# Patient Record
Sex: FEMALE | Race: BLACK OR AFRICAN AMERICAN | NOT HISPANIC OR LATINO | Employment: FULL TIME | URBAN - METROPOLITAN AREA
[De-identification: names, ages, dates, MRNs, and addresses within clinical notes are randomized per-mention and may not be internally consistent; named-entity substitution may affect disease eponyms.]

---

## 2019-09-27 ENCOUNTER — OFFICE VISIT (OUTPATIENT)
Dept: URGENT CARE | Facility: CLINIC | Age: 44
End: 2019-09-27
Payer: COMMERCIAL

## 2019-09-27 VITALS
RESPIRATION RATE: 18 BRPM | HEART RATE: 60 BPM | SYSTOLIC BLOOD PRESSURE: 150 MMHG | OXYGEN SATURATION: 100 % | DIASTOLIC BLOOD PRESSURE: 73 MMHG | BODY MASS INDEX: 31.31 KG/M2 | TEMPERATURE: 98.2 F | HEIGHT: 66 IN | WEIGHT: 194.8 LBS

## 2019-09-27 DIAGNOSIS — H66.91 RIGHT OTITIS MEDIA, UNSPECIFIED OTITIS MEDIA TYPE: Primary | ICD-10-CM

## 2019-09-27 PROCEDURE — G0382 LEV 3 HOSP TYPE B ED VISIT: HCPCS | Performed by: PHYSICIAN ASSISTANT

## 2019-09-27 RX ORDER — BENZONATATE 100 MG/1
100 CAPSULE ORAL 3 TIMES DAILY PRN
Qty: 15 CAPSULE | Refills: 0 | Status: SHIPPED | OUTPATIENT
Start: 2019-09-27

## 2019-09-27 RX ORDER — PREDNISONE 50 MG/1
50 TABLET ORAL DAILY
Qty: 5 TABLET | Refills: 0 | Status: SHIPPED | OUTPATIENT
Start: 2019-09-27 | End: 2019-10-02

## 2019-09-27 RX ORDER — AMOXICILLIN 500 MG/1
500 CAPSULE ORAL EVERY 12 HOURS SCHEDULED
Qty: 20 CAPSULE | Refills: 0 | Status: SHIPPED | OUTPATIENT
Start: 2019-09-27 | End: 2019-10-07

## 2019-09-27 NOTE — PATIENT INSTRUCTIONS
take antibiotic as directed until completed   take prednisone as directed until completed   use cough medicine as directed for symptomatic relief   Motrin and/or Tylenol as needed for fevers aches and pains   drink plenty of fluids  Follow up with PCP in 3-5 days  Proceed to  ER if symptoms worsen  Otitis Media   AMBULATORY CARE:   Otitis media  is an ear infection  Common symptoms include the following:   · Fever or a headache    · Ear pain    · Trouble hearing    · Ear feels plugged or full or you have ringing or buzzing in your ear    · Dizziness or you lose your balance    · Nausea or vomiting  Seek immediate care for the following symptoms:   · Seizure    · Fever and a stiff neck  Treatment for otitis media  may include any of the following:  · NSAIDs , such as ibuprofen, help decrease swelling, pain, and fever  This medicine is available with or without a doctor's order  NSAIDs can cause stomach bleeding or kidney problems in certain people  If you take blood thinner medicine, always ask your healthcare provider if NSAIDs are safe for you  Always read the medicine label and follow directions  · Ear drops  to help treat your ear pain  · Antibiotics  to help kill the germs that caused your ear infection  Care for otitis media:   · Use heat  Place a warm, moist washcloth on your ear to decrease pain  Apply for 15 to 20 minutes, 3 to 4 times a day    · Use ice  Ice helps decrease swelling and pain  Use an ice pack or put crushed ice in a plastic bag  Cover the ice pack with a towel and place it on your ear for 15 to 20 minutes, 3 to 4 times a day for 2 days  Prevent otitis media:   · Wash your hands often  This will help prevent the spread of germs  Encourage everyone in your house to wash their hands with soap and water after they use the bathroom  Everyone should also wash their hands after they change a child's diaper and before they prepare or eat food  · Stay away from people who are ill  Germs are easily and quickly spread through contact  Follow up with your healthcare provider as directed:  Write down your questions so you remember to ask them during your visits  © 2017 [employer] Information is for End User's use only and may not be sold, redistributed or otherwise used for commercial purposes  All illustrations and images included in CareNotes® are the copyrighted property of [employer]  or [employer]  The above information is an  only  It is not intended as medical advice for individual conditions or treatments  Talk to your doctor, nurse or pharmacist before following any medical regimen to see if it is safe and effective for you

## 2019-09-27 NOTE — PROGRESS NOTES
[facility]        NAME: Yaritza Kapadia is a 39 y o  female  : 1975    MRN: [id number]  DATE: 2019  TIME: 2:24 PM    Assessment and Plan   Right otitis media, unspecified otitis media type [H66 91]  1  Right otitis media, unspecified otitis media type  amoxicillin (AMOXIL) 500 mg capsule    benzonatate (TESSALON PERLES) 100 mg capsule    predniSONE 50 mg tablet         Patient Instructions      take antibiotic as directed until completed   take prednisone as directed until completed   use cough medicine as directed for symptomatic relief   Motrin and/or Tylenol as needed for fevers aches and pains   drink plenty of fluids  Follow up with PCP in 3-5 days  Proceed to  ER if symptoms worsen  Chief Complaint     Chief Complaint   Patient presents with    Cough     started last friday    Earache    Sore Throat         History of Present Illness        40-year-old female presents with five-day history of right ear discomfort and dry hacking cough  Denies any decreased hearing  No drainage from ear  Denies any fevers  No headaches or rashes reported  No chest pain shortness of breath  No abdominal pain nausea vomiting or diarrhea  Cough   This is a new problem  The problem has been waxing and waning  The problem occurs constantly  The cough is non-productive  Associated symptoms include postnasal drip and a sore throat  Pertinent negatives include no chills, ear congestion, ear pain, fever, headaches, shortness of breath or wheezing  Nothing aggravates the symptoms  She has tried nothing for the symptoms  The treatment provided no relief  There is no history of asthma  Earache    There is pain in the right ear  This is a new problem  The current episode started in the past 7 days  The problem has been waxing and waning  There has been no fever  The pain is moderate  Associated symptoms include coughing and a sore throat   Pertinent negatives include no ear discharge or headaches  She has tried nothing for the symptoms  The treatment provided no relief  Review of Systems   Review of Systems   Constitutional: Negative  Negative for chills and fever  HENT: Positive for postnasal drip and sore throat  Negative for ear discharge and ear pain  Respiratory: Positive for cough  Negative for shortness of breath and wheezing  Cardiovascular: Negative  Gastrointestinal: Negative  Musculoskeletal: Negative  Skin: Negative  Neurological: Negative  Negative for headaches  Current Medications       Current Outpatient Medications:     amoxicillin (AMOXIL) 500 mg capsule, Take 1 capsule (500 mg total) by mouth every 12 (twelve) hours for 10 days, Disp: 20 capsule, Rfl: 0    benzonatate (TESSALON PERLES) 100 mg capsule, Take 1 capsule (100 mg total) by mouth 3 (three) times a day as needed for cough, Disp: 15 capsule, Rfl: 0    predniSONE 50 mg tablet, Take 1 tablet (50 mg total) by mouth daily for 5 days, Disp: 5 tablet, Rfl: 0    Current Allergies     Allergies as of 09/27/2019    (No Known Allergies)            The following portions of the patient's history were reviewed and updated as appropriate: allergies, current medications, past family history, past medical history, past social history, past surgical history and problem list      History reviewed  No pertinent past medical history  History reviewed  No pertinent surgical history  Family History   Problem Relation Age of Onset    Diabetes Mother     Diabetes Father     Hypertension Father          Medications have been verified  Objective   /73   Pulse 60   Temp 98 2 °F (36 8 °C) (Temporal)   Resp 18   Ht 5' 6" (1 676 m)   Wt 88 4 kg (194 lb 12 8 oz)   LMP 09/03/2019 (Within Days)   SpO2 100%   BMI 31 44 kg/m²        Physical Exam     Physical Exam   Constitutional: She is oriented to person, place, and time  She appears well-developed and well-nourished   No distress  HENT:   Head: Normocephalic and atraumatic  Right Ear: Hearing, external ear and ear canal normal  Tympanic membrane is injected and bulging  Left Ear: Hearing, tympanic membrane, external ear and ear canal normal    Nose: Nose normal    Mouth/Throat: Uvula is midline, oropharynx is clear and moist and mucous membranes are normal  No oropharyngeal exudate, posterior oropharyngeal edema or posterior oropharyngeal erythema  Eyes: Conjunctivae and EOM are normal  Right eye exhibits no discharge  Left eye exhibits no discharge  Neck: Normal range of motion  Neck supple  Cardiovascular: Normal rate, regular rhythm, normal heart sounds and intact distal pulses  No murmur heard  Pulmonary/Chest: Effort normal and breath sounds normal  No respiratory distress  She has no wheezes  She has no rales  Abdominal: Soft  Bowel sounds are normal  There is no tenderness  Musculoskeletal: Normal range of motion  Lymphadenopathy:     She has no cervical adenopathy  Neurological: She is alert and oriented to person, place, and time  Skin: Skin is warm and dry  Psychiatric: She has a normal mood and affect  Nursing note and vitals reviewed

## 2023-12-29 ENCOUNTER — OFFICE VISIT (OUTPATIENT)
Age: 48
End: 2023-12-29
Payer: COMMERCIAL

## 2023-12-29 VITALS
DIASTOLIC BLOOD PRESSURE: 87 MMHG | SYSTOLIC BLOOD PRESSURE: 132 MMHG | HEART RATE: 77 BPM | BODY MASS INDEX: 42.69 KG/M2 | HEIGHT: 65 IN | WEIGHT: 256.2 LBS | OXYGEN SATURATION: 98 %

## 2023-12-29 DIAGNOSIS — E66.01 CLASS 3 SEVERE OBESITY DUE TO EXCESS CALORIES WITHOUT SERIOUS COMORBIDITY WITH BODY MASS INDEX (BMI) OF 40.0 TO 44.9 IN ADULT (HCC): ICD-10-CM

## 2023-12-29 DIAGNOSIS — Z12.11 ENCOUNTER FOR SCREENING COLONOSCOPY: Primary | ICD-10-CM

## 2023-12-29 PROCEDURE — 99203 OFFICE O/P NEW LOW 30 MIN: CPT | Performed by: COLON & RECTAL SURGERY

## 2023-12-29 NOTE — PATIENT INSTRUCTIONS
Scheduled date of colonoscopy (as of today): 1/15/24  Physician performing colonoscopy: Anny  Location of colonoscopy: [facility]  Bowel prep reviewed with patient: Golytely  Instructions reviewed with patient by: Denisse FERGUSON  Clearances:

## 2023-12-29 NOTE — PROGRESS NOTES
"Assessment/Plan:  Patient is a 48-year-old woman with average risk profile for colorectal cancer who presents for screening colonoscopy       Diagnoses and all orders for this visit:    Encounter for screening colonoscopy  -     Colonoscopy; Future    Class 3 severe obesity due to excess calories without serious comorbidity with body mass index (BMI) of 40.0 to 44.9 in adult (HCC)    Other orders  -     Diet NPO; Sips with meds; Standing  -     Void on call to OR; Standing  -     Insert peripheral IV; Standing          Subjective:      Patient ID: Alicia Shaw is a 48 y.o. female.    HPI patient is a 48-year-old woman with average colorectal risk profile presents for age indicated screening colonoscopy    The following portions of the patient's history were reviewed and updated as appropriate: allergies, current medications, past family history, past medical history, past social history, past surgical history, and problem list.    Review of Systems   Constitutional:  Negative for chills and fever.   HENT:  Negative for ear pain and sore throat.    Eyes:  Negative for pain and visual disturbance.   Respiratory:  Negative for cough and shortness of breath.    Cardiovascular:  Negative for chest pain and palpitations.   Gastrointestinal:  Negative for abdominal pain and vomiting.   Genitourinary:  Negative for dysuria and hematuria.   Musculoskeletal:  Negative for arthralgias and back pain.   Skin:  Negative for color change and rash.   Neurological:  Negative for seizures and syncope.   All other systems reviewed and are negative.        Objective:      /87   Pulse 77   Ht 5' 5\" (1.651 m)   Wt 116 kg (256 lb 3.2 oz)   SpO2 98%   BMI 42.63 kg/m²          Physical Exam  Vitals and nursing note reviewed.   Constitutional:       Appearance: Normal appearance. She is obese.   HENT:      Head: Normocephalic and atraumatic.   Eyes:      Conjunctiva/sclera: Conjunctivae normal.   Cardiovascular:      " Rate and Rhythm: Normal rate and regular rhythm.      Heart sounds: Normal heart sounds.   Pulmonary:      Effort: Pulmonary effort is normal.      Breath sounds: Normal breath sounds.   Abdominal:      General: Bowel sounds are normal.      Palpations: Abdomen is soft.   Musculoskeletal:      Cervical back: Neck supple.   Neurological:      Mental Status: She is alert and oriented to person, place, and time.   Psychiatric:         Mood and Affect: Mood normal.         Thought Content: Thought content normal.         Judgment: Judgment normal.

## 2024-01-08 ENCOUNTER — TELEPHONE (OUTPATIENT)
Age: 49
End: 2024-01-08

## 2024-01-08 NOTE — TELEPHONE ENCOUNTER
"I spoke to patient This is [facility] confirming your upcoming Colonoscopy/EGD for 1/15/24 with Dr Mccormick. Please keep in mind you will receive a phone call the day prior with your exact arrival time.     Now is a good time to review your prep instructions. If you have any questions regarding the diet or prep instructions, please contact the office @ [phone].     Please reply \"Yes\" to confirm. To reschedule, please call the office at [phone].      "

## 2024-01-15 ENCOUNTER — HOSPITAL ENCOUNTER (OUTPATIENT)
Dept: GASTROENTEROLOGY | Facility: HOSPITAL | Age: 49
Setting detail: OUTPATIENT SURGERY
Discharge: HOME | End: 2024-01-15
Attending: COLON & RECTAL SURGERY
Payer: COMMERCIAL

## 2024-01-15 ENCOUNTER — ANESTHESIA EVENT (OUTPATIENT)
Dept: GASTROENTEROLOGY | Facility: HOSPITAL | Age: 49
End: 2024-01-15

## 2024-01-15 ENCOUNTER — ANESTHESIA (OUTPATIENT)
Dept: GASTROENTEROLOGY | Facility: HOSPITAL | Age: 49
End: 2024-01-15

## 2024-01-15 VITALS
DIASTOLIC BLOOD PRESSURE: 76 MMHG | HEART RATE: 53 BPM | TEMPERATURE: 98 F | BODY MASS INDEX: 39.93 KG/M2 | HEIGHT: 66 IN | SYSTOLIC BLOOD PRESSURE: 136 MMHG | RESPIRATION RATE: 18 BRPM | WEIGHT: 248.46 LBS | OXYGEN SATURATION: 100 %

## 2024-01-15 DIAGNOSIS — Z12.11 ENCOUNTER FOR SCREENING COLONOSCOPY: ICD-10-CM

## 2024-01-15 PROBLEM — Z90.710 HISTORY OF HYSTERECTOMY: Status: ACTIVE | Noted: 2024-01-15

## 2024-01-15 PROCEDURE — G0121 COLON CA SCRN NOT HI RSK IND: HCPCS | Performed by: COLON & RECTAL SURGERY

## 2024-01-15 RX ORDER — SODIUM CHLORIDE, SODIUM LACTATE, POTASSIUM CHLORIDE, CALCIUM CHLORIDE 600; 310; 30; 20 MG/100ML; MG/100ML; MG/100ML; MG/100ML
INJECTION, SOLUTION INTRAVENOUS CONTINUOUS PRN
Status: DISCONTINUED | OUTPATIENT
Start: 2024-01-15 | End: 2024-01-15

## 2024-01-15 RX ORDER — PROPOFOL 10 MG/ML
INJECTION, EMULSION INTRAVENOUS AS NEEDED
Status: DISCONTINUED | OUTPATIENT
Start: 2024-01-15 | End: 2024-01-15

## 2024-01-15 RX ADMIN — PROPOFOL 100 MG: 10 INJECTION, EMULSION INTRAVENOUS at 08:45

## 2024-01-15 RX ADMIN — PROPOFOL 150 MG: 10 INJECTION, EMULSION INTRAVENOUS at 08:39

## 2024-01-15 RX ADMIN — SODIUM CHLORIDE, SODIUM LACTATE, POTASSIUM CHLORIDE, AND CALCIUM CHLORIDE: .6; .31; .03; .02 INJECTION, SOLUTION INTRAVENOUS at 08:23

## 2024-01-15 NOTE — ANESTHESIA PREPROCEDURE EVALUATION
Procedure:  COLONOSCOPY    Relevant Problems   ANESTHESIA (within normal limits)      CARDIO (within normal limits)      ENDO (within normal limits)      GI/HEPATIC  Confirmed NPO appropriate  S/p bowel prep      /RENAL (within normal limits)      GYN   (+) History of hysterectomy      HEMATOLOGY (within normal limits)      MUSCULOSKELETAL (within normal limits)      PULMONARY (within normal limits)   (-) Smoking   (-) URI (upper respiratory infection)      Other   (+) BMI 40.0-44.9, adult (HCC)        Physical Exam    Airway    Mallampati score: II  TM Distance: >3 FB  Neck ROM: full     Dental   No notable dental hx     Cardiovascular  Rhythm: regular, Rate: normal    Pulmonary   Breath sounds clear to auscultation    Other Findings  post-pubertal.      Anesthesia Plan  ASA Score- 2     Anesthesia Type- IV sedation with anesthesia with ASA Monitors.         Additional Monitors:     Airway Plan:     Comment: I discussed the risks and benefits of IV sedation anesthesia including the possibility of the need to convert to general anesthesia and the potential risk of awareness.  The patient was given the opportunity to ask questions, which were answered..       Plan Factors-Exercise tolerance (METS): >4 METS.    Chart reviewed.        Patient is not a current smoker.              Induction- intravenous.    Postoperative Plan-     Informed Consent- Anesthetic plan and risks discussed with patient and spouse.  I personally reviewed this patient with the CRNA. Discussed and agreed on the Anesthesia Plan with the CRNA..

## 2024-01-15 NOTE — INTERVAL H&P NOTE
H&P reviewed. After examining the patient I find no changes in the patients condition since the H&P had been written.    Vitals:    01/15/24 0708   BP: 139/95   Pulse: 65   Resp: 17   Temp: 97.7 °F (36.5 °C)   SpO2: 99%

## 2024-01-15 NOTE — ANESTHESIA POSTPROCEDURE EVALUATION
Post-Op Assessment Note    CV Status:  Stable    Pain management: adequate       Mental Status:  Alert and awake   Hydration Status:  Euvolemic   PONV Controlled:  Controlled   Airway Patency:  Patent     Post Op Vitals Reviewed: Yes      Staff: CRNA               BP   145/78   Temp   98.5   Pulse  76   Resp   18   SpO2   99

## 2024-05-02 ENCOUNTER — APPOINTMENT (OUTPATIENT)
Dept: RADIOLOGY | Facility: CLINIC | Age: 49
End: 2024-05-02
Payer: COMMERCIAL

## 2024-05-02 ENCOUNTER — APPOINTMENT (EMERGENCY)
Dept: VASCULAR ULTRASOUND | Facility: HOSPITAL | Age: 49
End: 2024-05-02
Payer: COMMERCIAL

## 2024-05-02 ENCOUNTER — HOSPITAL ENCOUNTER (EMERGENCY)
Facility: HOSPITAL | Age: 49
Discharge: HOME | End: 2024-05-02
Attending: EMERGENCY MEDICINE
Payer: COMMERCIAL

## 2024-05-02 ENCOUNTER — OFFICE VISIT (OUTPATIENT)
Dept: URGENT CARE | Facility: CLINIC | Age: 49
End: 2024-05-02
Payer: COMMERCIAL

## 2024-05-02 VITALS
TEMPERATURE: 98 F | RESPIRATION RATE: 18 BRPM | OXYGEN SATURATION: 99 % | DIASTOLIC BLOOD PRESSURE: 84 MMHG | HEART RATE: 59 BPM | SYSTOLIC BLOOD PRESSURE: 136 MMHG

## 2024-05-02 VITALS
SYSTOLIC BLOOD PRESSURE: 124 MMHG | TEMPERATURE: 98 F | HEART RATE: 63 BPM | RESPIRATION RATE: 20 BRPM | DIASTOLIC BLOOD PRESSURE: 80 MMHG | OXYGEN SATURATION: 100 %

## 2024-05-02 DIAGNOSIS — M79.661 RIGHT CALF PAIN: Primary | ICD-10-CM

## 2024-05-02 DIAGNOSIS — M25.561 ACUTE PAIN OF RIGHT KNEE: ICD-10-CM

## 2024-05-02 PROCEDURE — 93971 EXTREMITY STUDY: CPT | Performed by: SURGERY

## 2024-05-02 PROCEDURE — 99283 EMERGENCY DEPT VISIT LOW MDM: CPT

## 2024-05-02 PROCEDURE — 99283 EMERGENCY DEPT VISIT LOW MDM: CPT | Performed by: PHYSICIAN ASSISTANT

## 2024-05-02 PROCEDURE — 73564 X-RAY EXAM KNEE 4 OR MORE: CPT

## 2024-05-02 PROCEDURE — G0383 LEV 4 HOSP TYPE B ED VISIT: HCPCS | Performed by: PHYSICIAN ASSISTANT

## 2024-05-02 PROCEDURE — 93971 EXTREMITY STUDY: CPT

## 2024-05-02 NOTE — PROGRESS NOTES
[facility]        NAME: Alicia Shaw is a 48 y.o. female  : 1975    MRN: [id number]  DATE: May 2, 2024  TIME: 9:04 AM    Assessment and Plan   Right calf pain [M79.661]  1. Right calf pain  Transfer to other facility      2. Acute pain of right knee  XR knee 4+ vw right injury            Patient Instructions       Your preliminary x-rays of your right knee reveal chronic arthritic changes however, no acute findings.  A radiologist will also review and if there are any findings I will contact you to discuss further.    I suspect the possibility of deep vein thrombosis and recommend that she go to the emergency department for an ultrasound of the right calf.    Patient expressed verbal understanding and will be going to the [facility] ED for further evaluation and testing.    Follow up with PCP in 3-5 days.  Proceed to  ER if symptoms worsen.    If tests are performed, our office will contact you with results only if changes need to made to the care plan discussed with you at the visit. [facility]    Chief Complaint     Chief Complaint   Patient presents with    Knee Pain     Pt c/o right knee pain and calf swelling that started yesterday had started out feeling tight and then got worse and hard to walk on         History of Present Illness       48-year-old female is presenting today with complaint of right lateral wall calf pain since yesterday.  The patient denies straining her knee, injury or trauma.  Pain is dull aching and it feels like it is tight as per patient.  Pain is constant, 8/10, no alleviating factors other than resting.  Patient denies a history of right knee chronic pain.  DVT or pulmonary embolisms.  She denies chest pain, shortness of breath dyspnea tachypnea hemoptysis, fatigue, wheezing or fever.        Review of Systems   Review of Systems   Constitutional:  Negative for diaphoresis and fever.   Respiratory:  Negative  for cough and chest tightness.    Cardiovascular:  Negative for palpitations.   Gastrointestinal:  Negative for nausea and vomiting.   Skin:  Negative for color change, pallor and wound.   Neurological:  Negative for dizziness, weakness, light-headedness, numbness and headaches.         Current Medications     No current outpatient medications on file.    Current Allergies     Allergies as of 05/02/2024    (No Known Allergies)            The following portions of the patient's history were reviewed and updated as appropriate: allergies, current medications, past family history, past medical history, past social history, past surgical history and problem list.     History reviewed. No pertinent past medical history.    Past Surgical History:   Procedure Laterality Date    HYSTERECTOMY         Family History   Problem Relation Age of Onset    Diabetes Mother     Diabetes Father     Hypertension Father          Medications have been verified.        Objective   /84   Pulse 59   Temp 98 °F (36.7 °C) (Tympanic)   Resp 18   LMP 09/03/2019 (Within Days)   SpO2 99%        Physical Exam     Physical Exam  Vitals and nursing note reviewed.   Constitutional:       General: She is not in acute distress.     Appearance: Normal appearance. She is not ill-appearing, toxic-appearing or diaphoretic.   Eyes:      General: No scleral icterus.     Extraocular Movements: Extraocular movements intact.      Conjunctiva/sclera: Conjunctivae normal.      Pupils: Pupils are equal, round, and reactive to light.   Cardiovascular:      Rate and Rhythm: Normal rate and regular rhythm.      Pulses: Normal pulses.   Pulmonary:      Effort: Pulmonary effort is normal. No respiratory distress.      Breath sounds: Normal breath sounds. No wheezing, rhonchi or rales.   Musculoskeletal:         General: Tenderness present. No swelling or signs of injury. Normal range of motion.      Cervical back: Normal range of motion and neck supple.       Right lower leg: No edema.      Left lower leg: No edema.   Skin:     General: Skin is warm and dry.      Findings: Lesion (+ varicose veins right lateral calf. Tender on palpation.) present. No bruising or erythema.   Neurological:      Mental Status: She is alert and oriented to person, place, and time.      Coordination: Coordination normal.      Gait: Gait normal.   Psychiatric:         Mood and Affect: Mood normal.         Behavior: Behavior normal.

## 2024-05-02 NOTE — PATIENT INSTRUCTIONS
Deep Vein Thrombosis   WHAT YOU NEED TO KNOW:   Deep vein thrombosis (DVT) is a blood clot that forms in a deep vein of the body. The DVT can break into smaller pieces and travel to your lungs and cause a blockage called a pulmonary embolism. A PE can become life-threatening. It is important to go to all follow-up appointments and to take blood thinners as directed. This is especially important if you were discharged home from the emergency department.       DISCHARGE INSTRUCTIONS:   Call your local emergency number (911 in the [place]) if:   You feel lightheaded, short of breath, and have chest pain.    You cough up blood.    Return to the emergency department if:   Your arm or leg feels warm, tender, and painful. It may look swollen and red.      Call your doctor or hematologist if:   You have questions or concerns about your condition or care.      Medicines:   Blood thinners  help prevent blood clots. Clots can cause strokes, heart attacks, and death. Many types of blood thinners are available. Your healthcare provider will give you specific instructions for the type you are given. The following are general safety guidelines to follow while you are taking a blood thinner:    Watch for bleeding and bruising. Watch for bleeding from your gums or nose. Watch for blood in your urine and bowel movements. Use a soft washcloth on your skin, and a soft toothbrush to brush your teeth. This can keep your skin and gums from bleeding. If you shave, use an electric shaver. Do not play contact sports.    Tell your dentist and other healthcare providers that you take a blood thinner. Wear a bracelet or necklace that says you take this medicine.    Do not start or stop any other medicines or supplements unless your healthcare provider tells you to. Many medicines and supplements cannot be used with blood thinners.    Take your blood thinner exactly as prescribed by your healthcare provider. Do not skip does or take less than  prescribed. Tell your provider right away if you forget to take your blood thinner, or if you take too much.    Take your medicine as directed.  Contact your healthcare provider if you think your medicine is not helping or if you have side effects. Tell your provider if you are allergic to any medicine. Keep a list of the medicines, vitamins, and herbs you take. Include the amounts, and when and why you take them. Bring the list or the pill bottles to follow-up visits. Carry your medicine list with you in case of an emergency.    Manage a DVT:   Wear pressure stockings as directed.  The stockings put pressure on your legs. This improves blood flow and helps prevent clots. Wear the stockings during the day. Do not wear them when you sleep.         Elevate your legs above the level of your heart.  Elevate your legs when you sit or lie down, as often as you can. This will help decrease swelling and pain. Prop your legs on pillows or blankets to keep them elevated comfortably.       Prevent a DVT:   Exercise regularly to help increase your blood flow.  Walking is a good low-impact exercise. Talk to your healthcare provider about the best exercise plan for you.         Change your body position or move around often.  Move and stretch in your seat several times each hour if you travel by car or work at a desk. In an airplane, get up and walk every hour. Move your legs by tightening and releasing your leg muscles while sitting. You can move your legs while sitting by raising and lowering your heels. Keep your toes on the floor while you do this. You can also raise and lower your toes while keeping your heels on the floor.            Maintain a healthy weight.  Ask your healthcare provider what a healthy weight is for you. Ask him or her to help you create a weight loss plan if you are overweight.    Do not smoke.  Nicotine and other chemicals in cigarettes and cigars can damage blood vessels and make it more difficult to  manage your DVT. Ask your healthcare provider for information if you currently smoke and need help to quit. E-cigarettes or smokeless tobacco still contain nicotine. Talk to your healthcare provider before you use these products.    Ask about birth control if you are a woman who takes the pill.  A birth control pill increases the risk for PE in certain women. The risk is higher if you are also older than 35, smoke cigarettes, or have a blood clotting disorder. Talk to your healthcare provider about other ways to prevent pregnancy, such as a cervical cap or intrauterine device (IUD).    Follow up with your doctor or hematologist as directed:  You may need to come in regularly for scans to check for blood clots. Your blood may be checked to see how long it takes to clot. Your doctor or specialist will tell you if you need to have this test and how often to have it. Write down your questions so you remember to ask them during your visits.  © Copyright [employer] 2023 Information is for End User's use only and may not be sold, redistributed or otherwise used for commercial purposes.  The above information is an  only. It is not intended as medical advice for individual conditions or treatments. Talk to your doctor, nurse or pharmacist before following any medical regimen to see if it is safe and effective for you.

## 2024-05-02 NOTE — DISCHARGE INSTRUCTIONS
Take Tylenol and ibuprofen for pain. Apply ice to affected area.    Please follow-up with your family doctor. Return to the ER with any worsening symptoms.

## 2024-05-02 NOTE — ED PROVIDER NOTES
History  Chief Complaint   Patient presents with    Leg Pain     Pt presents with c/o right lateral wall calf pain since yesterday, denies straining her knee, injury or trauma. Felt a slight pain in the calf about 3 weeks ago which subsided, returned a few days ago from trip where she was on a plane for approx 5hrs.      49yo female with a history of obesity presenting for evaluation of calf pain. She started with right knee pain and stiffness yesterday. Her pain is now located in the posterior calf and thigh. Pain is worse with ambulation. No trauma. She was seen at urgent care this morning. She had x-rays done and was told that they were normal. She was sent to the ED for concern for a DVT. She denies any chest pain or shortness of breath. No prior history of VTE. She had a 5 hour plane ride from [facility] last week.       History provided by:  Patient and medical records   used: No    Leg Pain  Associated symptoms: no fever        None       History reviewed. No pertinent past medical history.    Past Surgical History:   Procedure Laterality Date    HYSTERECTOMY         Family History   Problem Relation Age of Onset    Diabetes Mother     Diabetes Father     Hypertension Father      I have reviewed and agree with the history as documented.    E-Cigarette/Vaping    E-Cigarette Use Never User      E-Cigarette/Vaping Substances    Nicotine No     THC No     CBD No     Flavoring No     Other No     Unknown No      Social History     Tobacco Use    Smoking status: Never    Smokeless tobacco: Never   Vaping Use    Vaping status: Never Used   Substance Use Topics    Alcohol use: Yes     Comment: less than monthly    Drug use: Never       Review of Systems   Constitutional:  Negative for chills and fever.   Eyes:  Negative for discharge and redness.   Respiratory:  Negative for shortness of breath.    Cardiovascular:  Negative for chest pain and leg swelling.   Musculoskeletal:  Positive for myalgias.  Negative for joint swelling.   Skin:  Negative for color change and wound.   Neurological:  Negative for weakness and numbness.   Psychiatric/Behavioral:  Negative for confusion. The patient is not nervous/anxious.    All other systems reviewed and are negative.      Physical Exam  Physical Exam  Vitals and nursing note reviewed.   Constitutional:       General: She is not in acute distress.     Appearance: Normal appearance. She is not toxic-appearing.   HENT:      Head: Normocephalic and atraumatic.      Right Ear: External ear normal.      Left Ear: External ear normal.   Eyes:      General: No scleral icterus.        Right eye: No discharge.         Left eye: No discharge.      Conjunctiva/sclera: Conjunctivae normal.   Cardiovascular:      Rate and Rhythm: Normal rate.   Pulmonary:      Effort: Pulmonary effort is normal. No respiratory distress.      Breath sounds: No stridor.   Musculoskeletal:         General: No deformity. Normal range of motion.      Cervical back: Normal range of motion.      Right lower leg: No edema.      Left lower leg: No edema.      Comments: Right leg is normal to inspection. No pitting edema or skin changes. No reproducible tenderness in calf. No knee effusion, warmth, or erythema noted. ROM of knee mildly decreased 2/2 pain. 2+ PT pulse and sensation intact.    Skin:     General: Skin is warm and dry.   Neurological:      General: No focal deficit present.      Mental Status: She is alert. Mental status is at baseline.   Psychiatric:         Mood and Affect: Mood normal.         Behavior: Behavior normal.         Vital Signs  ED Triage Vitals [05/02/24 1049]   Temperature Pulse Respirations Blood Pressure SpO2   98 °F (36.7 °C) 63 20 124/80 100 %      Temp Source Heart Rate Source Patient Position - Orthostatic VS BP Location FiO2 (%)   Temporal Monitor Sitting Left arm --      Pain Score       10 - Worst Possible Pain           Vitals:    05/02/24 1049   BP: 124/80   Pulse: 63    Patient Position - Orthostatic VS: Sitting         Visual Acuity      ED Medications  Medications - No data to display    Diagnostic Studies  Results Reviewed       None                   VAS VENOUS DUPLEX -LOWER LIMB UNILATERAL   Final Result by Narciso Macedo MD (05/02 9595)                 Procedures  Procedures         ED Course  ED Course as of 05/02/24 2053   Thu May 02, 2024   1138 Informed by vascular tech that venous duplex is negative for DVT. No Baker's cyst noted.                  SBIRT 20yo+      Flowsheet Row Most Recent Value   Initial Alcohol Screen: US AUDIT-C     1. How often do you have a drink containing alcohol? 0 Filed at: 05/02/2024 1112   2. How many drinks containing alcohol do you have on a typical day you are drinking?  0 Filed at: 05/02/2024 1112   3b. FEMALE Any Age, or MALE 65+: How often do you have 4 or more drinks on one occassion? 0 Filed at: 05/02/2024 1112   Audit-C Score 0 Filed at: 05/02/2024 1112   KYUNG: How many times in the past year have you...    Used an illegal drug or used a prescription medication for non-medical reasons? Never Filed at: 05/02/2024 1112                      Medical Decision Making  48yoF here with atraumatic R calf pain x 1 day. Recent long plane ride. Sent by urgent care to r/o DVT. No CP/SOB. She is well appearing in no distress. No pitting edema on exam. RLE is neurovascularly intact.    Venous duplex obtained which is negative for DVT. She is stable for discharge. Supportive care discussed. Advised close PCP follow-up. ED return precautions discussed. Patient expressed understanding and is agreeable to plan. Patient discharged in stable condition.        Problems Addressed:  Right calf pain: acute illness or injury             Disposition  Final diagnoses:   Right calf pain     Time reflects when diagnosis was documented in both MDM as applicable and the Disposition within this note       Time User Action Codes Description Comment    5/2/2024  11:41 AM Leticia Breen Add [M79.661] Right calf pain           ED Disposition       ED Disposition   Discharge    Condition   Stable    Date/Time   Thu May 2, 2024 1141    Comment   Alicia Shaw discharge to home/self care.                   Follow-up Information       Follow up With Specialties Details Why Contact Info Additional Information    [facility]  Schedule an appointment as soon as possible for a visit   [address]  [facility] [address]  [phone]       [facility] Emergency Medicine  If symptoms worsen [address] [facility] [id number] [phone] [facility], [address], [place], [address]            There are no discharge medications for this patient.      No discharge procedures on file.    PDMP Review       None            ED Provider  Electronically Signed by             Leticia Breen PA-C  05/02/24 2058

## 2024-05-13 ENCOUNTER — OFFICE VISIT (OUTPATIENT)
Dept: OBGYN CLINIC | Facility: CLINIC | Age: 49
End: 2024-05-13
Payer: COMMERCIAL

## 2024-05-13 VITALS
BODY MASS INDEX: 41.14 KG/M2 | WEIGHT: 256 LBS | TEMPERATURE: 98.2 F | RESPIRATION RATE: 18 BRPM | HEIGHT: 66 IN | HEART RATE: 68 BPM | OXYGEN SATURATION: 98 % | SYSTOLIC BLOOD PRESSURE: 153 MMHG | DIASTOLIC BLOOD PRESSURE: 97 MMHG

## 2024-05-13 DIAGNOSIS — M17.11 PRIMARY OSTEOARTHRITIS OF RIGHT KNEE: Primary | ICD-10-CM

## 2024-05-13 DIAGNOSIS — R20.2 RIGHT HAND PARESTHESIA: ICD-10-CM

## 2024-05-13 PROCEDURE — 99204 OFFICE O/P NEW MOD 45 MIN: CPT | Performed by: FAMILY MEDICINE

## 2024-05-13 NOTE — PROGRESS NOTES
Subjective:    Chief Complaint   Patient presents with    Right Knee - Pain, Swelling       Alicia Shaw is a 48 y.o. female complains of right knee and proximal calf pain. Onset of the symptoms was several weeks ago.  Mechanism of injury:  none . Aggravating factors: none. Treatment to date: prescription NSAIDS which are effective and rest. Symptoms have essentially resolved. Additionally reporting right hand paresthesia symptoms, job duty involves typing.       The following portions were reviewed and updated as needed: allergies, current medications, past medical history, past social history, past surgical history and problem list.    Review of Systems   Constitutional: Negative for fever.   HENT: Negative for dental problem and headaches.    Eyes: Negative for vision loss.   Respiratory: Negative for cough and shortness of breath.    Cardiovascular: Negative for leg swelling and palpitations.   Gastrointestinal: Negative for constipation and diarrhea.   Genitourinary: Negative for bladder incontinence and difficulty urinating.   Musculoskeletal: Negative for back pain and difficulty walking.   Skin: Negative for rash and ulcer.   Neurological: Negative for dizziness and headaches.   Hem/Lymph/Immuno: Negative for blood clots. Does not bruise/bleed easily.   Psychiatric/Behavioral: Negative for confusion.         Objective:  General: no acute distress, non toxic, AAO x3   Skin: no skin changes, no rashes, no wounds or laceration  Vasculature: normal cap refill, no LE edema, normal popliteal and dorsalis pedis pulse  Neurologic:   Musculoskeletal: right KNEE EXAM  Gait: limping gait negative, able to weight bear without difficulty  Inspection: No gross deformity, no redness or warmth   Effusion: negative   Medial joint line TTP: negative  Lateral joint line TTP: negative  ROM: Full flexion and extension  Lani's: negative,   Instability to varus/valgus stress: negative  Anterior Drawer: negative    Lachman's test: negative  Posterior Drawer: negative    Right hand  +Tinels   +durkan  No thenar atrophy  Median/ulnar/raidal motor WNL          Imaging:       Assessment/Plan:  1. Primary osteoarthritis of right knee      2. Right hand paresthesia    - EMG 1 Limb; Future  - Cock Up Wrist Splint    48-year-old female patient presenting today for initial evaluation of right knee and right hand paresthesias.  Radiographs of the right knee reveals moderate degree of knee osteoarthritis.  She reports subjective improvement pain symptoms with oral NSAID medication and activity modification.  Recommending that she continue with home exercise program, can continue with Voltaren topical gel and advised to ice the affected area.  We discussed considering a corticosteroid injection if symptoms recur.  Finally in regards to right hand paresthesia, clinical impression is that she is most likely suffering from carpal tunnel syndrome.  She will be provided a cock up wrist brace for nighttime use.  Additionally I am referring patient for EMG study to evaluate for carpal tunnel syndrome.  She will follow-up once EMG results are completed

## 2024-06-03 ENCOUNTER — PROCEDURE VISIT (OUTPATIENT)
Dept: NEUROLOGY | Facility: CLINIC | Age: 49
End: 2024-06-03
Payer: COMMERCIAL

## 2024-06-03 DIAGNOSIS — R20.2 RIGHT HAND PARESTHESIA: ICD-10-CM

## 2024-06-03 PROCEDURE — 95909 NRV CNDJ TST 5-6 STUDIES: CPT | Performed by: PHYSICAL MEDICINE & REHABILITATION

## 2024-06-03 PROCEDURE — 95886 MUSC TEST DONE W/N TEST COMP: CPT | Performed by: PHYSICAL MEDICINE & REHABILITATION

## 2024-06-10 ENCOUNTER — OFFICE VISIT (OUTPATIENT)
Dept: OBGYN CLINIC | Facility: CLINIC | Age: 49
End: 2024-06-10
Payer: COMMERCIAL

## 2024-06-10 VITALS
WEIGHT: 253 LBS | HEART RATE: 68 BPM | HEIGHT: 66 IN | BODY MASS INDEX: 40.66 KG/M2 | DIASTOLIC BLOOD PRESSURE: 83 MMHG | SYSTOLIC BLOOD PRESSURE: 147 MMHG | TEMPERATURE: 98 F | RESPIRATION RATE: 18 BRPM | OXYGEN SATURATION: 98 %

## 2024-06-10 DIAGNOSIS — G56.01 CARPAL TUNNEL SYNDROME OF RIGHT WRIST: Primary | ICD-10-CM

## 2024-06-10 PROCEDURE — 99213 OFFICE O/P EST LOW 20 MIN: CPT | Performed by: FAMILY MEDICINE

## 2024-06-10 RX ORDER — DICLOFENAC SODIUM 20 MG/G
80 SOLUTION TOPICAL DAILY
Qty: 112 G | Refills: 0 | Status: SHIPPED | OUTPATIENT
Start: 2024-06-10

## 2024-06-10 NOTE — PROGRESS NOTES
Subjective:    No chief complaint on file.      Alicia Shaw is a 48 y.o. female presenting for a follow-up visit to discuss the EMG results for the right upper extremity.  The EMG results were consistent with findings of moderate carpal tunnel syndrome.      The following portions were reviewed and updated as needed: allergies, current medications, past medical history, past social history, past surgical history and problem list.    Review of Systems   Constitutional: Negative for fever.   HENT: Negative for dental problem and headaches.    Eyes: Negative for vision loss.   Respiratory: Negative for cough and shortness of breath.    Cardiovascular: Negative for leg swelling and palpitations.   Gastrointestinal: Negative for constipation and diarrhea.   Genitourinary: Negative for bladder incontinence and difficulty urinating.   Musculoskeletal: Negative for back pain and difficulty walking.   Skin: Negative for rash and ulcer.   Neurological: Negative for dizziness and headaches.   Hem/Lymph/Immuno: Negative for blood clots. Does not bruise/bleed easily.   Psychiatric/Behavioral: Negative for confusion.         Objective:      Right hand  +Tinels   +durkan  No thenar atrophy  Median/ulnar/raidal motor WNL          Imaging:       Assessment/Plan:  1. Carpal tunnel syndrome of right wrist    Continue with cock up wrist brace.  Home exercise program was provided for carpal tunnel.  Discussed role for corticosteroid injection however patient declines.  She has tolerated Pennsaid topical medication in the past and would like a refill-provided

## 2024-12-24 ENCOUNTER — TELEPHONE (OUTPATIENT)
Age: 49
End: 2024-12-24

## 2024-12-24 DIAGNOSIS — Z12.11 ENCOUNTER FOR SCREENING COLONOSCOPY: Primary | ICD-10-CM
